# Patient Record
Sex: FEMALE | URBAN - METROPOLITAN AREA
[De-identification: names, ages, dates, MRNs, and addresses within clinical notes are randomized per-mention and may not be internally consistent; named-entity substitution may affect disease eponyms.]

---

## 2022-04-15 ENCOUNTER — APPOINTMENT (RX ONLY)
Dept: URBAN - METROPOLITAN AREA CLINIC 87 | Facility: CLINIC | Age: 62
Setting detail: DERMATOLOGY
End: 2022-04-15

## 2022-04-15 DIAGNOSIS — L82.0 INFLAMED SEBORRHEIC KERATOSIS: ICD-10-CM

## 2022-04-15 PROCEDURE — 17110 DESTRUCTION B9 LES UP TO 14: CPT

## 2022-04-15 PROCEDURE — ? TREATMENT REGIMEN

## 2022-04-15 PROCEDURE — ? COUNSELING

## 2022-04-15 PROCEDURE — ? BENIGN DESTRUCTION

## 2022-04-15 ASSESSMENT — LOCATION DETAILED DESCRIPTION DERM: LOCATION DETAILED: LEFT MEDIAL MALAR CHEEK

## 2022-04-15 ASSESSMENT — LOCATION SIMPLE DESCRIPTION DERM: LOCATION SIMPLE: LEFT CHEEK

## 2022-04-15 ASSESSMENT — LOCATION ZONE DERM: LOCATION ZONE: FACE

## 2022-04-15 NOTE — PROCEDURE: BENIGN DESTRUCTION
Treatment Number (Will Not Render If 0): 1
Detail Level: Simple
Render Note In Bullet Format When Appropriate: No
Anesthesia Volume In Cc: 1.5
Consent: The patient's verbal consent was obtained including but not limited to risks of crusting, scabbing, blistering, scarring, darker or lighter pigmentary change, recurrence, incomplete removal and infection.
Medical Necessity Information: It is in your best interest to select a reason for this procedure from the list below. All of these items fulfill various CMS LCD requirements except the new and changing color options.
Include Z78.9 (Other Specified Conditions Influencing Health Status) As An Associated Diagnosis?: Yes
Post-Care Instructions: I reviewed with the patient in detail post-care instructions. Patient is to wear sunprotection, and avoid picking at any of the treated lesions. Pt may apply Vaseline to crusted or scabbing areas. Pt was instructed to wash off any topical application (cantharidin and/or squaric acid) in 1 hour or as otherwise directed.
Medical Necessity Clause: This procedure was medically necessary because the lesions that were treated were:

## 2022-04-15 NOTE — PROCEDURE: TREATMENT REGIMEN
Plan: Pt was given treatment options including LN2 vs shave excision/biopsy. Pt preferred LN2 today. I explained that it is possible that the lesion can return. Most likely it will scab over and fall off after 1-2 weeks. For possible swelling, I suggested that the pt take benadryl.
Detail Level: Simple